# Patient Record
Sex: MALE | Race: WHITE
[De-identification: names, ages, dates, MRNs, and addresses within clinical notes are randomized per-mention and may not be internally consistent; named-entity substitution may affect disease eponyms.]

---

## 2018-03-23 ENCOUNTER — HOSPITAL ENCOUNTER (EMERGENCY)
Dept: HOSPITAL 25 - ED | Age: 43
Discharge: HOME | End: 2018-03-23
Payer: COMMERCIAL

## 2018-03-23 DIAGNOSIS — R05: ICD-10-CM

## 2018-03-23 DIAGNOSIS — F17.210: ICD-10-CM

## 2018-03-23 DIAGNOSIS — B34.9: Primary | ICD-10-CM

## 2018-03-23 PROCEDURE — 71045 X-RAY EXAM CHEST 1 VIEW: CPT

## 2018-03-23 PROCEDURE — 99281 EMR DPT VST MAYX REQ PHY/QHP: CPT

## 2018-03-23 NOTE — RAD
Indication: Cough, fever.



Comparison: No relevant prior exams available on the [facility] PACS for comparison.



Technique: Upright AP 1120 hours



Report: Mild prominence of the interstitial markings. No compelling alveolar consolidation

to suggest pneumonia. Negative for pleural effusion or pneumothorax. The heart, pulmonary

vasculature, and mediastinal contours are unremarkable.



IMPRESSION: No compelling evidence for pneumonia. No evidence for acute intrathoracic

disease.

## 2018-03-23 NOTE — ED
Jacqueline CAMEJO Gabriel scribmonika for Kevin Adame on 03/23/18 at 1053 .





Influenza-Like Illness





- HPI Summary


HPI Summary: 





This patient is a 43 year old M presenting to [facility] with a chief complaint of 

flu like symptoms two days ago. The patient rates the pain 5/10 in severity. 

Patient reports cough, low back pain, and myalgia. Patient denies CP, SOB, fever

, ear pain, sore throat, and ABD pain. Pt has had exposure to sick persons, his 

wife has the flu. 





- History of Current Complaint


Chief Complaint: EDFluSymptoms


Time Seen by Provider: 03/23/18 10:43


Hx Obtained From: Patient


Onset/Duration: Lasting Days, Still Present


Severity: Moderate


Associated Signs & Symptoms: Myalgia, Cough





- Allergy/Home Medications


Allergies/Adverse Reactions: 


 Allergies











Allergy/AdvReac Type Severity Reaction Status Date / Time


 


No Known Allergies Allergy   Verified 03/23/18 10:35














PMH/Surg Hx/FS Hx/Imm Hx


Endocrine/Hematology History: 


   Denies: Hx Diabetes, Hx Sickle Cell Disease


Cardiovascular History: 


   Denies: Hx Angina, Hx Angioplasty


Respiratory History: 


   Denies: Hx Lung Cancer, Hx Pleural Effusion, Hx Pneumonia


 History: 


   Denies: Hx Acute Renal Failure


Psychiatric History: 


   Denies: Hx Oppositional Defiance Disorder


Infectious Disease History: No


Infectious Disease History: 


   Denies: Traveled Outside the [place] in Last 30 Days





- Family History


Known Family History: 


   Negative: Respiratory Disease, Seizure Disorder





- Social History


Lives: With Family


Alcohol Use: Occasionally


Substance Use Type: Reports: None


Smoking Status (MU): Heavy Every Day Tobacco Smoker





Review of Systems


Negative: Fever


Negative: Sore Throat, Ear Ache


Negative: Chest Pain


Positive: Cough.  Negative: Shortness Of Breath


Negative: Abdominal Pain


Positive: Myalgia, Other - low back pain 


All Other Systems Reviewed And Are Negative: Yes





Physical Exam





- Summary


Physical Exam Summary: 





Appearance: Well appearing, no pain distress


Skin: warm, dry, reflects adequate perfusion


Head/face: normal


Eyes: EOMI, NAKIA


ENT: normal


Neck: supple, non-tender


Respiratory: CTA, breath sounds present


Cardiovascular: RRR, pulses symmetrical 


Abdomen: non-tender, soft


Bowel: present


Musculoskeletal: normal, strength/ROM intact


Neuro: normal, sensory motor intact, A&Ox3 





Triage Information Reviewed: Yes


Vital Signs On Initial Exam: 


 Initial Vitals











Temp Pulse Resp BP Pulse Ox


 


 97.8 F   94   16   139/90   98 


 


 03/23/18 10:35  03/23/18 10:35  03/23/18 10:35  03/23/18 10:35  03/23/18 10:35











Vital Signs Reviewed: Yes





Diagnostics





- Vital Signs


 Vital Signs











  Temp Pulse Resp BP Pulse Ox


 


 03/23/18 10:35  97.8 F  94  16  139/90  98














- Laboratory


Lab Statement: Any lab studies that have been ordered have been reviewed, and 

results considered in the medical decision making process.





- Radiology


  ** CXR


Radiology Interpretation Completed By: Radiologist - No compelling evidence for 

pneumonia. No evidence for acute intrathoracic disease.  ED physician has 

reviewed this radiology report.





Re-Evaluation





- Re-Evaluation


  ** First Eval


Re-Evaluation Time: 12:21


Change: Unchanged - The pt refused a flu swab and states he wants to be treated 

anyway due to the exposure to his wife.





  ** Second Eval


Re-Evaluation Time: 12:32


Change: Unchanged - I discussed the test results with the pt.





Flu Symptom Course/Dx





- Course


Assessment/Plan: Pt presents with cough and flu like symptoms.  CXR reveals, 

per radiologist, No compelling evidence for pneumonia. No evidence for acute 

intrathoracic.  disease.  The pt refused a flu swab and states he wants to be 

treated anyway due to the exposure to his wife.  Pt will be treated 

prophylactically.  In the ED course the patient was given Tamiflu and Motrin.  

Dx viral syndrome and cough.  Patient will be discharged with prescription for 

tamiflu and follow up from the physician referral center.  The patient is 

agreeable with this plan.





- Diagnoses


Differential Diagnosis/HQI/PQRI: Positive: Bronchitis, Influenza, Pneumonia, RSV

, Upper Respiratory Infection


Provider Diagnoses: 


 Viral syndrome, Cough








Discharge





- Sign-Out/Discharge


Documenting (check all that apply): Discharge





- Discharge Plan


Condition: Stable


Disposition: HOME


Prescriptions: 


Ibuprofen TAB* [Motrin TAB* 600 MG] 600 mg PO Q8H PRN #20 tab MDD 3


 PRN Reason: Pain


Oseltamivir CAP* [Tamiflu CAP*] 75 mg PO BID #10 cap


Patient Education Materials:  Viral Syndrome (ED)


Referrals[place] 


[facility] [place] 3 Days


Additional Instructions: 





Please return if symptoms get worse in the next 48 hours. RETURN TO THE 

EMERGENCY DEPARTMENT FOR CHANGING OR WORSENING SYMPTOMS 








- Billing Disposition and Condition


Condition: STABLE


Disposition: HOME





The documentation as recorded by the Jacqueline hooper Gabriel accurately reflects 

the service I personally performed and the decisions made by me, Kevin Adame.